# Patient Record
Sex: MALE | Race: BLACK OR AFRICAN AMERICAN | NOT HISPANIC OR LATINO | Employment: UNEMPLOYED | URBAN - METROPOLITAN AREA
[De-identification: names, ages, dates, MRNs, and addresses within clinical notes are randomized per-mention and may not be internally consistent; named-entity substitution may affect disease eponyms.]

---

## 2017-05-20 ENCOUNTER — HOSPITAL ENCOUNTER (EMERGENCY)
Facility: HOSPITAL | Age: 27
Discharge: HOME | End: 2017-05-20
Attending: EMERGENCY MEDICINE
Payer: MEDICAID

## 2017-05-20 VITALS
BODY MASS INDEX: 26.66 KG/M2 | RESPIRATION RATE: 18 BRPM | DIASTOLIC BLOOD PRESSURE: 66 MMHG | HEIGHT: 69 IN | SYSTOLIC BLOOD PRESSURE: 136 MMHG | WEIGHT: 180 LBS | HEART RATE: 84 BPM | TEMPERATURE: 98 F | OXYGEN SATURATION: 99 %

## 2017-05-20 DIAGNOSIS — L40.3 PSORIASIS PLANTARIS: Primary | ICD-10-CM

## 2017-05-20 DIAGNOSIS — M79.673 FOOT PAIN: ICD-10-CM

## 2017-05-20 DIAGNOSIS — L40.3 PSORIASIS PALMARIS: ICD-10-CM

## 2017-05-20 PROCEDURE — 99284 EMERGENCY DEPT VISIT MOD MDM: CPT

## 2017-05-20 RX ORDER — CLOBETASOL PROPIONATE 0.5 MG/G
CREAM TOPICAL 2 TIMES DAILY
Qty: 45 G | Refills: 5 | Status: SHIPPED | OUTPATIENT
Start: 2017-05-20 | End: 2017-05-30

## 2017-05-20 RX ORDER — SULFAMETHOXAZOLE AND TRIMETHOPRIM 800; 160 MG/1; MG/1
1 TABLET ORAL 2 TIMES DAILY
Qty: 14 TABLET | Refills: 0 | Status: SHIPPED | OUTPATIENT
Start: 2017-05-20 | End: 2017-05-27

## 2017-05-20 RX ORDER — TRAMADOL HYDROCHLORIDE 50 MG/1
50 TABLET ORAL EVERY 6 HOURS PRN
Qty: 14 TABLET | Refills: 0 | Status: SHIPPED | OUTPATIENT
Start: 2017-05-20 | End: 2017-05-30

## 2017-05-20 NOTE — ED NOTES
Pt came to seek care of ongoing skin infection to bilateral feet and hands. Pt has seen numerous dermatologists and states he has no improvement. Toes to both feet are black with skin peeling away on the bottom of his feet.

## 2017-05-20 NOTE — ED PROVIDER NOTES
SCRIBE #1 NOTE: I, Bernie Silver, am scribing for, and in the presence of, Enmanuel Maldonado NP. I have scribed the entire note.      History      Chief Complaint   Patient presents with    Psoriasis     bilat hands/feet/elbows       Review of patient's allergies indicates:  No Known Allergies     HPI   HPI    5/20/2017, 12:46 PM   History obtained from the patient      History of Present Illness: Benny Avila Jr. is a 26 y.o. male patient who presents to the Emergency Department for diffuse skin lesions to extremities x4 which onset gradually over 4 years ago. Pt states that his skin condition has worsened over the past week. Symptoms are constant and servere in severity. Associated sxs include drainage/odor from skin, pain, itching, skin stiffness/cracking. Pt states that his skin is so stiff and painful that he is no longer able to walk or feed himself. Patient denies any fever, chills, tingling, and all other sxs at this time. States that he has seen dermatologist and rheumatologist in the past with no dx.  Pt states that he has spent large amounts of money on tx with no relief or concrete dx. Reports that 3 days ago [facility] dx it as psoriasis but is unsure if it is really psoriasis. No further complaints or concerns at this time.         Arrival mode: Personal vehicle      PCP: Unknown      Past Medical History:  No pertinent past medical history on file.    Past Surgical History:  Past Surgical History:   Procedure Laterality Date    HERNIA REPAIR           Family History:  Family History   Problem Relation Age of Onset    Stroke Father        Social History:  Social History     Social History Main Topics    Smoking status: Former Smoker    Smokeless tobacco: No    Alcohol use Yes      Comment: occassionally    Drug use: No    Sexual activity: Female       ROS   Review of Systems   Constitutional: Negative for chills and fever.   HENT: Negative for sore throat.    Respiratory: Negative  "for shortness of breath.    Cardiovascular: Negative for chest pain.   Gastrointestinal: Negative for nausea.   Genitourinary: Negative for dysuria.   Musculoskeletal: Negative for back pain.   Skin: Positive for color change (black ) and wound. Negative for rash.        + painful skin   + skin stiffness/cracking  + drainage/odor from skin   + itching      Neurological: Negative for weakness and numbness.        - tingling    Hematological: Does not bruise/bleed easily.     Physical Exam    Initial Vitals   BP Pulse Resp Temp SpO2   05/20/17 1222 05/20/17 1222 05/20/17 1222 05/20/17 1222 05/20/17 1222   136/66 84 18 97.8 °F (36.6 °C) 99 %      Physical Exam  Nursing Notes and Vital Signs Reviewed.  Constitutional: Patient is in no acute distress. Well-developed and well-nourished.  Head: Atraumatic. Normocephalic.  Eyes: PERRL. EOM intact. Conjunctivae are not pale. No scleral icterus.  ENT: Mucous membranes are moist. Oropharynx is clear and symmetric.    Neck: Supple. Full ROM. No lymphadenopathy.  Cardiovascular: Regular rate. Regular rhythm. No murmurs, rubs, or gallops. Distal pulses are 2+ and symmetric.  Pulmonary/Chest: No respiratory distress. Clear to auscultation bilaterally. No wheezing, rales, or rhonchi.  Abdominal: Soft and non-distended.  There is no tenderness.  No rebound, guarding, or rigidity. Musculoskeletal: Moves all extremities. No obvious deformities. No edema.   Skin: *See Pictures for details.  Neurological:  Alert, awake, and appropriate.  Normal speech.  No acute focal neurological deficits are appreciated.  Psychiatric: Normal affect. Good eye contact. Appropriate in content.                    ED Course    Procedures  ED Vital Signs:  Vitals:    05/20/17 1222   BP: 136/66   Pulse: 84   Resp: 18   Temp: 97.8 °F (36.6 °C)   TempSrc: Oral   SpO2: 99%   Weight: 81.6 kg (180 lb)   Height: 5' 9" (1.753 m)         Imaging Results:  Imaging Results         X-Ray Hand 3 view Right (Final " result) Result time:  05/20/17 13:15:01    Final result by Ren Chery MD (05/20/17 13:15:01)    Impression:           1.  Negative for acute process involving the visualized osseous structures.  Technical limitations as described above.      Electronically signed by: REN CHERY MD  Date:     05/20/17  Time:    13:15     Narrative:    Right hand x-ray, 3 views :    Clinical Indication: Pain in right hand.     Findings: Comparisons are made to 07/07/2014.  Patient was unable to straighten his fingers.   of the   submitted for interpretation.        Alignment is satisfactory. No   fractures, dislocations, or erosive arthritic change.  Negative for radiopaque foreign bodies or air in the soft tissues.            X-Ray Foot Complete Right (Final result) Result time:  05/20/17 13:15:57    Final result by Ren Chery MD (05/20/17 13:15:57)    Impression:           1.  Negative for acute process involving the right foot.  2.  Degenerative spurring of the hindfoot as described above.      Electronically signed by: REN CHERY MD  Date:     05/20/17  Time:    13:15     Narrative:    Right foot x-ray, 3 views :    Clinical Indication: Pain in right foot.     Findings: No comparison studies are available. 3 views of the right foot were submitted for interpretation.  A large talar beak.  Mild degenerative changes of the dorsal surface of the navicular bone.  Medial malleolus spurring.    Alignment is satisfactory. No   fractures, dislocations, or erosive arthritic change.  Negative for radiopaque foreign bodies or air in the soft tissues.                  The Emergency Provider reviewed the vital signs and test results, which are outlined above.    ED Discussion     2:00 PM: Reassessed pt at this time.  Pt states his condition is unchanged at this time. Discussed with pt all pertinent ED information and results. Discussed with pt his plan of tx. Gave pt all f/u and return to the ED instructions. Recommended that pt  f/u with [facility]. All questions and concerns were addressed at this time. Pt expresses understanding of information and instructions, and is comfortable with plan to discharge. Pt is stable for discharge.    I discussed wound care precautions with patient and/or family/caretaker; specifically that all wounds have risk of infection despite efforts to cleanse and debride the wound; and there is a risk of an occult foreign body (and thus increased risk of infection) despite a negative examination.  I discussed with patient need to return for any signs of infection, specifically redness, increased pain, fever, drainage of pus, or any concern, immediately.        ED Medication(s):      New Prescriptions    CLOBETASOL (TEMOVATE) 0.05 % CREAM    Apply topically 2 (two) times daily.    SULFAMETHOXAZOLE-TRIMETHOPRIM 800-160MG (BACTRIM DS) 800-160 MG TAB    Take 1 tablet by mouth 2 (two) times daily.    TRAMADOL (ULTRAM) 50 MG TABLET    Take 1 tablet (50 mg total) by mouth every 6 (six) hours as needed.       Follow-up Information     [facility]. Schedule an appointment as soon as possible for a visit in 2 days.            Medical Decision Making    Medical Decision Making:   Clinical Tests:   Radiological Study: Ordered and Reviewed           Scribe Attestation:   Scribe #1: I performed the above scribed service and the documentation accurately describes the services I performed. I attest to the accuracy of the note.    Attending:   Physician Attestation Statement for Scribe #1: I, Enmanuel Maldonado NP, personally performed the services described in this documentation, as scribed by Bernie Silver, in my presence, and it is both accurate and complete.         Attending Attestation:     Physician Attestation Statement for NP/PA:   I have conducted a face to face encounter with this patient in addition to the NP/PA, due to NP/PA Request    Other NP/PA Attestation Additions:      Medical Decision  Making: Patient has had extensive workup in the past but is not currently being managed by a specialist for his significant rash.  We'll initiate high-dose steroids-there is no evidence of infection at this time.  Patient will need to follow-up with a dermatologist and return to emergency department for any worsening signs or symptoms.             Clinical Impression       ICD-10-CM ICD-9-CM   1. Psoriasis plantaris  696.1   2. Foot pain M79.673 729.5   3. Psoriasis palmaris  696.1       Disposition:   Disposition: Discharged  Condition: Stable         Enmanuel Maldonado NP  05/20/17 1403       Lex White MD  05/20/17 8311

## 2017-05-20 NOTE — ED AVS SNAPSHOT
"          [facility]  [address] [address]               Benny Avila JrKris   2017 12:24 PM   ED    Description:  Male : 1990   Department:  [facility]            Your Care was Coordinated By:     Provider Role From To    Enmanuel Maldonado NP Nurse Practitioner 17 [phone] --      Reason for Visit     Psoriasis           Diagnoses this Visit        Comments    Psoriasis plantaris    -  Primary     Foot pain           ED Disposition     None           To Do List           Follow-up Information     Follow up with \[facility]"" dermatology clinic. Schedule an appointment as soon as possible for a visit in 2 days.      [facility]     [facility] Nurse Care Line -  Assistance  Unless otherwise directed by your provider, please contact [facility], our nurse care line that is available for  assistance.     Registered nurses in the [facility] provide: appointment scheduling, clinical advisement, health education, and other advisory services.  Call: [phone] (toll free)               Medications                Verify that the below list of medications is an accurate representation of the medications you are currently taking.  If none reported, the list may be blank. If incorrect, please contact your healthcare provider. Carry this list with you in case of emergency.           Current Medications     clotrimazole (LOTRIMIN) 1 % cream Apply to affected area 2 times daily    ibuprofen (ADVIL,MOTRIN) 800 MG tablet Take 1 tablet (800 mg total) by mouth every 6 (six) hours as needed for Pain.           Clinical Reference Information           Your Vitals Were     BP Pulse Temp Resp Height Weight    136/66 (BP Location: Right arm, Patient Position: Sitting) 84 97.8 °F (36.6 °C) (Oral) 18 5' 9" (1.753 m) 81.6 kg (180 lb)    SpO2 BMI             99% 26.58 kg/m2         Allergies as of 2017     No Known Allergies "      Immunizations Administered on Date of Encounter - 5/20/2017     None      ED Micro, Lab, POCT     None      ED Imaging Orders     Start Ordered       Status Ordering Provider    05/20/17 1251 05/20/17 1251  X-Ray Hand 3 view Right  1 time imaging      Final result     05/20/17 1251 05/20/17 1251  X-Ray Foot Complete Right  1 time imaging      Final result       Discharge References/Attachments     PSORIASIS, MANAGING (ENGLISH)      [facility] Sign-Up     Activating your [facility] account is as easy as 1-2-3!     1) Visit [email or web address], select Sign Up Now, enter this activation code and your date of birth, then select Next.  [email or web address]  Expires: 7/4/2017  2:00 PM      2) Create a username and password to use when you visit [facility] in the future and select a security question in case you lose your password and select Next.    3) Enter your e-mail address and click Sign Up!    Additional Information  If you have questions, please e-mail [email or web address] or call [phone] to talk to our [facility] staff. Remember, [facility] is NOT to be used for urgent needs. For medical emergencies, dial 911.         Smoking Cessation     If you would like to quit smoking:   You may be eligible for free services if you are a [place] resident and started smoking cigarettes before September 1, 1988.  Call the Smoking Cessation [employer] (SCT) toll free at (311) 281-0714 or (291) 437-9174.   Call 0-370-QUIT-NOW if you do not meet the above criteria.   Contact us via email: [email or web address]   View our website for more information: [email or web address]         [facility] complies with applicable Federal civil rights laws and does not discriminate on the basis of race, color, national origin, age, disability, or sex.        Language Assistance Services     ATTENTION: Language assistance services are available, free of charge. Please call [phone].      ATENCIÓN: Amrik strange  tiene a turcios disposición servicios gratuitos de asistencia lingüística. Llame al [phone].     BLAYNE Ý: N?u b?n nói Ti?ng Vi?t, có các d?ch v? h? tr? ngôn ng? mi?n phí ludivinah cho b?n. G?i s? [phone].

## 2018-07-06 ENCOUNTER — HOSPITAL ENCOUNTER (EMERGENCY)
Facility: HOSPITAL | Age: 28
Discharge: HOME | End: 2018-07-06
Attending: EMERGENCY MEDICINE

## 2018-07-06 VITALS
BODY MASS INDEX: 26.51 KG/M2 | RESPIRATION RATE: 20 BRPM | HEIGHT: 70 IN | WEIGHT: 185.19 LBS | HEART RATE: 69 BPM | SYSTOLIC BLOOD PRESSURE: 120 MMHG | DIASTOLIC BLOOD PRESSURE: 55 MMHG | OXYGEN SATURATION: 99 % | TEMPERATURE: 98 F

## 2018-07-06 DIAGNOSIS — R07.9 CHEST PAIN: ICD-10-CM

## 2018-07-06 DIAGNOSIS — R07.89 ANTERIOR CHEST WALL PAIN: Primary | ICD-10-CM

## 2018-07-06 DIAGNOSIS — Z72.0 TOBACCO ABUSE DISORDER: ICD-10-CM

## 2018-07-06 PROCEDURE — 99284 EMERGENCY DEPT VISIT MOD MDM: CPT | Mod: 25

## 2018-07-06 PROCEDURE — 93010 ELECTROCARDIOGRAM REPORT: CPT | Mod: ,,, | Performed by: INTERNAL MEDICINE

## 2018-07-06 PROCEDURE — 93005 ELECTROCARDIOGRAM TRACING: CPT

## 2018-07-06 NOTE — ED PROVIDER NOTES
"SCRIBE #1 NOTE: I, Jeannie Amy, am scribing for, and in the presence of, Jyotsna Rosario MD. I have scribed the entire note.      History      Chief Complaint   Patient presents with    Chest Pain     midsternal right sided chest pain that began yesterday with shortness of breath       Review of patient's allergies indicates:   Allergen Reactions    Betamethasone dipropionate     Citrus and derivatives     Shellfish containing products         HPI   HPI    7/6/2018, 1:31 PM   History obtained from the patient      History of Present Illness: Benny Avila Jr. is a 27 y.o. male patient who presents to the Emergency Department for R sided chest wall pain which onset suddenly 1 day ago. Pt describes that R sides chest wall pain feels like a "sharp pain". Pt states pain is exacerbated whenever he laughs or moves around.  Pt reports working in an attic yesterday and accidentally inhaled dust from insulation. Symptoms are constant and moderate in severity. No associated sxs included. Patient denies any nausea, vomiting, SOB, chest tightness, palpiations, chest pain, headache, fever, chills, and all other sxs at this time. No prior Tx included. No further complaints or concerns at this time.         Arrival mode: Personal vehicle     PCP: Primary Doctor No       Past Medical History:  History reviewed. No pertinent past medical history.    Past Surgical History:  Past Surgical History:   Procedure Laterality Date    HERNIA REPAIR           Family History:  Family History   Problem Relation Age of Onset    Stroke Father        Social History:  Social History     Social History Main Topics    Smoking status: Current Every Day Smoker     Packs/day: 0.50     Types: Cigarettes    Smokeless tobacco: Never Used    Alcohol use Yes      Comment: occassionally    Drug use: Yes     Types: Marijuana    Sexual activity: Unknown       ROS   Review of Systems   Constitutional: Negative for chills, diaphoresis, " fatigue and fever.   HENT: Negative for congestion and sore throat.    Respiratory: Negative for cough, choking, chest tightness and shortness of breath.    Cardiovascular: Negative for chest pain, palpitations and leg swelling.   Gastrointestinal: Negative for abdominal pain, diarrhea, nausea and vomiting.   Genitourinary: Negative for dysuria.   Musculoskeletal: Negative for back pain and myalgias.        (+) R sided chest wall pain   Skin: Negative for rash and wound.   Neurological: Negative for weakness, light-headedness and headaches.   Hematological: Does not bruise/bleed easily.       Physical Exam      Initial Vitals [07/06/18 1227]   BP Pulse Resp Temp SpO2   (!) 141/70 73 (!) 22 97.9 °F (36.6 °C) 100 %      MAP       --          Physical Exam  Nursing Notes and Vital Signs Reviewed.  Constitutional: Patient is in no acute distress. Well-developed and well-nourished.  Head: Atraumatic. Normocephalic.  Eyes: PERRL. EOM intact. Conjunctivae are not pale. No scleral icterus.  ENT: Mucous membranes are moist. Oropharynx is clear and symmetric.    Neck: Supple. Full ROM. No lymphadenopathy.  Cardiovascular: Regular rate. Regular rhythm. No murmurs, rubs, or gallops.   Pulmonary/Chest: No respiratory distress. Clear to auscultation bilaterally. No wheezing or rales. Reproducible R sided chest wall pain.  Abdominal: Soft and non-distended.  There is no tenderness.  No rebound, guarding, or rigidity. Good bowel sounds.  Genitourinary: No CVA tenderness  Musculoskeletal: Moves all extremities. No obvious deformities.   Skin: Warm and dry.  Neurological:  Alert, awake, and appropriate.  Normal speech.  No acute focal neurological deficits are appreciated.  Psychiatric: Normal affect. Good eye contact. Appropriate in content.    ED Course    Procedures  ED Vital Signs:  Vitals:    07/06/18 1227 07/06/18 1235 07/06/18 1236 07/06/18 1245   BP: (!) 141/70   (!) 125/57   Pulse: 73 65 65 67   Resp: (!) 22   18   Temp:  "97.9 °F (36.6 °C)      TempSrc: Oral      SpO2: 100%   100%   Weight: 84 kg (185 lb 3 oz)      Height: 5' 10" (1.778 m)       07/06/18 1330 07/06/18 1409   BP: (!) 119/55 (!) 120/55   Pulse: 71 69   Resp: 18 20   Temp:  98 °F (36.7 °C)   TempSrc:  Oral   SpO2: 100% 99%   Weight:     Height:         Abnormal Lab Results:  Labs Reviewed - No data to display     All Lab Results:  None    Imaging Results:  Imaging Results          X-Ray Chest PA And Lateral (Final result)  Result time 07/06/18 13:24:24    Final result by Karri Hernandez MD (07/06/18 13:24:24)                 Impression:      Negative      Electronically signed by: Karri Hernandez MD  Date:    07/06/2018  Time:    13:24             Narrative:    EXAMINATION:  XR CHEST PA AND LATERAL    CLINICAL HISTORY:  Chest pain, unspecified    COMPARISON:  None    FINDINGS:  Normal heart size. Clear lungs.                               The EKG was ordered, reviewed, and independently interpreted by the ED provider.  Interpretation time: 12:32  Rate: 69 BPM  Rhythm: normal sinus rhythm  Interpretation:  No STEMI.         The Emergency Provider reviewed the vital signs and test results, which are outlined above.    ED Discussion     1:55 PM:  Discussed with pt all pertinent ED information and results. Discussed to pt dx and plan of tx. Gave pt all f/u and return to the ED instructions. All questions and concerns were addressed at this time. Pt expresses understanding of information and instructions, and is comfortable with plan to discharge. Pt is stable for discharge.        ED Medication(s):  Medications - No data to display    Discharge Medication List as of 7/6/2018  1:53 PM          Follow-up Information     [facility]. Schedule an appointment as soon as possible for a visit in 2 days.    Why:  Return to the Emergency Room, If symptoms worsen  Contact information:  [address] [facility] [id number] [phone]                     Medical " Decision Making    Medical Decision Making:   Clinical Tests:   Radiological Study: Reviewed and Ordered  Medical Tests: Ordered and Reviewed           Scribe Attestation:   Scribe #1: I performed the above scribed service and the documentation accurately describes the services I performed. I attest to the accuracy of the note.    Attending:   Physician Attestation Statement for Scribe #1: I, Jyotsna Rosario MD, personally performed the services described in this documentation, as scribed by Jeannie Reyes, in my presence, and it is both accurate and complete.          Clinical Impression       ICD-10-CM ICD-9-CM   1. Anterior chest wall pain R07.89 786.52   2. Chest pain R07.9 786.50   3. Tobacco abuse disorder Z72.0 305.1       Disposition:   Disposition: Discharged  Condition: Stable         Jyotsna Rosario MD  07/06/18 1516       Jyotsna Rosario MD  07/06/18 1517

## 2018-07-06 NOTE — ED NOTES
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 Pain is reproducible on palpation.

## 2020-03-20 ENCOUNTER — HOSPITAL ENCOUNTER (EMERGENCY)
Facility: HOSPITAL | Age: 30
Discharge: HOME | End: 2020-03-21
Attending: FAMILY MEDICINE
Payer: MEDICAID

## 2020-03-20 DIAGNOSIS — L03.115 CELLULITIS OF RIGHT LOWER EXTREMITY: Primary | ICD-10-CM

## 2020-03-20 PROCEDURE — 99283 EMERGENCY DEPT VISIT LOW MDM: CPT

## 2020-03-21 VITALS
DIASTOLIC BLOOD PRESSURE: 59 MMHG | RESPIRATION RATE: 13 BRPM | OXYGEN SATURATION: 100 % | SYSTOLIC BLOOD PRESSURE: 119 MMHG | WEIGHT: 171 LBS | TEMPERATURE: 98 F | HEART RATE: 93 BPM | BODY MASS INDEX: 24.48 KG/M2 | HEIGHT: 70 IN

## 2020-03-21 LAB — HIV 1+2 AB+HIV1 P24 AG SERPL QL IA: NEGATIVE

## 2020-03-21 PROCEDURE — 86703 HIV-1/HIV-2 1 RESULT ANTBDY: CPT

## 2020-03-21 PROCEDURE — 36415 COLL VENOUS BLD VENIPUNCTURE: CPT

## 2020-03-21 RX ORDER — SULFAMETHOXAZOLE AND TRIMETHOPRIM 800; 160 MG/1; MG/1
1 TABLET ORAL 2 TIMES DAILY
Qty: 14 TABLET | Refills: 0 | Status: SHIPPED | OUTPATIENT
Start: 2020-03-21 | End: 2020-03-28

## 2020-03-21 RX ORDER — SULFAMETHOXAZOLE AND TRIMETHOPRIM 800; 160 MG/1; MG/1
1 TABLET ORAL 2 TIMES DAILY
Qty: 14 TABLET | Refills: 0 | Status: SHIPPED | OUTPATIENT
Start: 2020-03-21 | End: 2020-03-21 | Stop reason: SDUPTHER

## 2020-03-21 NOTE — ED NOTES
Pt given discharge instructions, follow up care reviewed. Pt verbalizes understanding. No further questions at this time.

## 2020-03-21 NOTE — ED NOTES
"Pt presents with wound in between the fourth and fifth metatarsals. Pt also has wounds to the right leg and states, "I've been squeezing pus out of two of these and my roommate told me I had staph".  "

## 2023-10-25 ENCOUNTER — HOSPITAL ENCOUNTER (EMERGENCY)
Facility: HOSPITAL | Age: 33
Discharge: HOME | End: 2023-10-25
Attending: EMERGENCY MEDICINE
Payer: MEDICAID

## 2023-10-25 VITALS
RESPIRATION RATE: 16 BRPM | DIASTOLIC BLOOD PRESSURE: 87 MMHG | TEMPERATURE: 98 F | BODY MASS INDEX: 25.12 KG/M2 | HEIGHT: 71 IN | OXYGEN SATURATION: 98 % | WEIGHT: 179.44 LBS | SYSTOLIC BLOOD PRESSURE: 135 MMHG | HEART RATE: 92 BPM

## 2023-10-25 DIAGNOSIS — R36.9 PENILE DISCHARGE: Primary | ICD-10-CM

## 2023-10-25 DIAGNOSIS — J02.9 SORE THROAT: ICD-10-CM

## 2023-10-25 DIAGNOSIS — J02.9 ACUTE PHARYNGITIS, UNSPECIFIED ETIOLOGY: ICD-10-CM

## 2023-10-25 LAB
GROUP A STREP, MOLECULAR: NEGATIVE
HCV AB SERPL QL IA: NEGATIVE
HEP C VIRUS HOLD SPECIMEN: NORMAL
HIV 1+2 AB+HIV1 P24 AG SERPL QL IA: NEGATIVE

## 2023-10-25 PROCEDURE — 63600175 PHARM REV CODE 636 W HCPCS: Performed by: REGISTERED NURSE

## 2023-10-25 PROCEDURE — 87491 CHLMYD TRACH DNA AMP PROBE: CPT | Performed by: REGISTERED NURSE

## 2023-10-25 PROCEDURE — 87651 STREP A DNA AMP PROBE: CPT | Performed by: REGISTERED NURSE

## 2023-10-25 PROCEDURE — 96372 THER/PROPH/DIAG INJ SC/IM: CPT | Performed by: REGISTERED NURSE

## 2023-10-25 PROCEDURE — 99284 EMERGENCY DEPT VISIT MOD MDM: CPT

## 2023-10-25 PROCEDURE — 86803 HEPATITIS C AB TEST: CPT | Performed by: EMERGENCY MEDICINE

## 2023-10-25 PROCEDURE — 87389 HIV-1 AG W/HIV-1&-2 AB AG IA: CPT | Performed by: EMERGENCY MEDICINE

## 2023-10-25 RX ORDER — CEFTRIAXONE 1 G/1
1 INJECTION, POWDER, FOR SOLUTION INTRAMUSCULAR; INTRAVENOUS
Status: COMPLETED | OUTPATIENT
Start: 2023-10-25 | End: 2023-10-25

## 2023-10-25 RX ORDER — IBUPROFEN 800 MG/1
800 TABLET ORAL EVERY 6 HOURS PRN
Qty: 20 TABLET | Refills: 0 | Status: SHIPPED | OUTPATIENT
Start: 2023-10-25

## 2023-10-25 RX ORDER — DOXYCYCLINE 100 MG/1
100 CAPSULE ORAL 2 TIMES DAILY
Qty: 14 CAPSULE | Refills: 0 | Status: SHIPPED | OUTPATIENT
Start: 2023-10-25 | End: 2023-11-01

## 2023-10-25 RX ADMIN — CEFTRIAXONE SODIUM 1 G: 1 INJECTION, POWDER, FOR SOLUTION INTRAMUSCULAR; INTRAVENOUS at 10:10

## 2023-10-25 NOTE — Clinical Note
"Benny Minasebastian Avila was seen and treated in our emergency department on 10/25/2023.  He may return to work on 10/27/2023.       If you have any questions or concerns, please don't hesitate to call.      Jonatan Kenyon Jr., FNP"

## 2023-10-25 NOTE — ED PROVIDER NOTES
Encounter Date: 10/25/2023       History     Chief Complaint   Patient presents with    Sore Throat     Patient with c/o sore throat, penile discharge, painful urination, vomiting, and diarrhea x 2 days.     32-year-old male presents emergency department complaints of sore throat, penile discharge, dysuria and diarrhea that began 2 days ago.  Patient denies any abdominal pains, chest pains, shortness of breath, weakness, dizziness or any other symptoms at this time.    The history is provided by the patient.     Review of patient's allergies indicates:   Allergen Reactions    Betamethasone dipropionate     Citrus and derivatives     Shellfish containing products      No past medical history on file.  Past Surgical History:   Procedure Laterality Date    HERNIA REPAIR       Family History   Problem Relation Age of Onset    Stroke Father      Social History     Tobacco Use    Smoking status: Every Day     Current packs/day: 0.50     Types: Cigarettes    Smokeless tobacco: Never   Substance Use Topics    Alcohol use: Yes     Comment: occassionally    Drug use: Yes     Types: Marijuana     Review of Systems   Constitutional:  Positive for activity change and appetite change. Negative for fever.   HENT:  Positive for sore throat.    Respiratory:  Negative for shortness of breath.    Cardiovascular:  Negative for chest pain.   Gastrointestinal:  Positive for diarrhea. Negative for nausea.   Genitourinary:  Positive for penile discharge. Negative for dysuria.   Musculoskeletal:  Negative for back pain.   Skin:  Negative for rash.   Neurological:  Negative for weakness.   Hematological:  Does not bruise/bleed easily.   All other systems reviewed and are negative.      Physical Exam     Initial Vitals [10/25/23 0857]   BP Pulse Resp Temp SpO2   (!) 141/83 86 18 98.5 °F (36.9 °C) 99 %      MAP       --         Physical Exam    Constitutional: He appears well-developed and well-nourished. No distress.   HENT:   Head:  Normocephalic and atraumatic.   Nose: Nose normal.   Mouth/Throat: Uvula is midline. Oropharyngeal exudate, posterior oropharyngeal edema and posterior oropharyngeal erythema present.   Eyes: Conjunctivae and EOM are normal. Pupils are equal, round, and reactive to light.   Neck: Neck supple.   Normal range of motion.  Cardiovascular:  Normal rate and regular rhythm.           Pulmonary/Chest: Effort normal and breath sounds normal. No respiratory distress. He has no decreased breath sounds. He has no wheezes. He has no rales.   Abdominal: Abdomen is soft. Bowel sounds are normal. There is no abdominal tenderness.   Musculoskeletal:         General: Normal range of motion.      Cervical back: Normal range of motion and neck supple.     Neurological: He is alert and oriented to person, place, and time. He has normal strength. GCS eye subscore is 4. GCS verbal subscore is 5. GCS motor subscore is 6.   Skin: Skin is warm and dry. Capillary refill takes less than 2 seconds. No rash noted.   Psychiatric: He has a normal mood and affect. His speech is normal and behavior is normal.         ED Course   Procedures  Labs Reviewed   GROUP A STREP, MOLECULAR   C. TRACHOMATIS/N. GONORRHOEAE BY AMP DNA   HIV 1 / 2 ANTIBODY    Narrative:     Release to patient->Immediate   HEPATITIS C ANTIBODY    Narrative:     Release to patient->Immediate   HEP C VIRUS HOLD SPECIMEN    Narrative:     Release to patient->Immediate          Imaging Results    None          Medications   cefTRIAXone injection 1 g (1 g Intramuscular Given 10/25/23 1041)     Medical Decision Making  Amount and/or Complexity of Data Reviewed  Labs: ordered.     Details: Strep throat negative  GC pending    Risk  Prescription drug management.  Risk Details: Rocephin given in the emergency department.  Prescription of doxycycline.  Follow up with primary care.                               Clinical Impression:   Final diagnoses:  [J02.9] Acute pharyngitis, unspecified  etiology  [J02.9] Sore throat  [R36.9] Penile discharge (Primary)        ED Disposition Condition    Discharge Stable          ED Prescriptions       Medication Sig Dispense Start Date End Date Auth. Provider    doxycycline (VIBRAMYCIN) 100 MG Cap Take 1 capsule (100 mg total) by mouth 2 (two) times daily. for 7 days 14 capsule 10/25/2023 11/1/2023 Jonatan Kenyon Jr., FNP    ibuprofen (ADVIL,MOTRIN) 800 MG tablet Take 1 tablet (800 mg total) by mouth every 6 (six) hours as needed for Pain. 20 tablet 10/25/2023 -- Jonatan Kenyon Jr., FNP          Follow-up Information       Follow up With Specialties Details Why Contact Info    [facility] - Emergency Dept. Emergency Medicine  If symptoms worsen [id number] [facility] [id number] [phone]             Jonatan Kenyon Jr., FNP  10/25/23 2092

## 2023-10-26 LAB
C TRACH DNA SPEC QL NAA+PROBE: NOT DETECTED
N GONORRHOEA DNA SPEC QL NAA+PROBE: NOT DETECTED